# Patient Record
Sex: FEMALE | Race: WHITE | NOT HISPANIC OR LATINO | Employment: FULL TIME | URBAN - METROPOLITAN AREA
[De-identification: names, ages, dates, MRNs, and addresses within clinical notes are randomized per-mention and may not be internally consistent; named-entity substitution may affect disease eponyms.]

---

## 2021-12-17 ENCOUNTER — OFFICE VISIT (OUTPATIENT)
Dept: URGENT CARE | Facility: URGENT CARE | Age: 31
End: 2021-12-17
Payer: COMMERCIAL

## 2021-12-17 VITALS
DIASTOLIC BLOOD PRESSURE: 81 MMHG | HEART RATE: 90 BPM | SYSTOLIC BLOOD PRESSURE: 121 MMHG | TEMPERATURE: 98.3 F | OXYGEN SATURATION: 98 %

## 2021-12-17 DIAGNOSIS — H66.93 ACUTE BILATERAL OTITIS MEDIA: Primary | ICD-10-CM

## 2021-12-17 DIAGNOSIS — R05.9 COUGH: ICD-10-CM

## 2021-12-17 DIAGNOSIS — H69.92 DYSFUNCTION OF LEFT EUSTACHIAN TUBE: ICD-10-CM

## 2021-12-17 DIAGNOSIS — H92.03 ACUTE EAR PAIN, BILATERAL: ICD-10-CM

## 2021-12-17 PROCEDURE — 99203 OFFICE O/P NEW LOW 30 MIN: CPT | Performed by: FAMILY MEDICINE

## 2021-12-17 PROCEDURE — U0003 INFECTIOUS AGENT DETECTION BY NUCLEIC ACID (DNA OR RNA); SEVERE ACUTE RESPIRATORY SYNDROME CORONAVIRUS 2 (SARS-COV-2) (CORONAVIRUS DISEASE [COVID-19]), AMPLIFIED PROBE TECHNIQUE, MAKING USE OF HIGH THROUGHPUT TECHNOLOGIES AS DESCRIBED BY CMS-2020-01-R: HCPCS | Performed by: FAMILY MEDICINE

## 2021-12-17 PROCEDURE — U0005 INFEC AGEN DETEC AMPLI PROBE: HCPCS | Performed by: FAMILY MEDICINE

## 2021-12-17 RX ORDER — FLUTICASONE PROPIONATE 50 MCG
2 SPRAY, SUSPENSION (ML) NASAL DAILY
Qty: 18.2 ML | Refills: 1 | Status: SHIPPED | OUTPATIENT
Start: 2021-12-17

## 2021-12-17 RX ORDER — AMOXICILLIN 875 MG
875 TABLET ORAL 2 TIMES DAILY
Qty: 20 TABLET | Refills: 0 | Status: SHIPPED | OUTPATIENT
Start: 2021-12-17 | End: 2021-12-27

## 2021-12-17 NOTE — LETTER
[facility] [place]  [address]  [address]  [place] [address]  [phone]      December 17, 2021    RE:  Danni Acuna                                                                                                                                                       [address] [address]            To whom it may concern:    Danni Acuna is under my professional care at the [facility] on December 17, 2021.   She has pain in both ears, otitis media of the ears, and eustachian tube dysfunction.  As a result please excuse her absence from work on December 17, 2021. .   She may resume flying on December 18, 2021, provided that her ears are feeling better.            Sincerely,        Anthony Arndt MD    [facility]

## 2021-12-17 NOTE — PROGRESS NOTES
SUBJECTIVE:   Danni Acuna is a 31 year old female--she works as a --presenting with a chief complaint of sudden-onset bilateral ear pain during the descent on a flight from [place] this morning, nasal congestion since yesterday, body aches in the back (upper back and posterior shoulders) since three days ago.  .  ..    Current and Associated symptoms: There has been decreased hearing.  No ear discharge.  No dizziness.  No ringing in th ears.  No balance problems. No bleeding from the ears.     Treatment measures tried include none. .  Predisposing factors include Patient works as a  for [employer].  .  .    Patient received two doses of the Moderna COVID-19 vaccine.  The second one was received on May 22, 2021.  .        No loss of smell/taste  No fevers  No shortness of breath  Not much cough  No headaches  No fatigue  No body aches  No bluish lips/toes/fingers  No chest pain  No vomiting  No diarrhea  No abdominal pain      Past Medical History:    Fracture of the left ankle.      No current outpatient medications on file.     Social History     Tobacco Use     Smoking status: Not on file     Smokeless tobacco: Not on file   Substance Use Topics     Alcohol use: Not on file     Patient is a  for [employer].  She lives in the [place] area.      ROS:  CONSTITUTIONAL:NEGATIVE  for fevers.   ENT/MOUTH:  Positive for bilateral ear pain.      OBJECTIVE:  /81   Pulse 90   Temp 98.3  F (36.8  C) (Tympanic)   SpO2 98%   Breastfeeding No   GENERAL APPEARANCE: healthy, alert and no distress.  No acute respiratory distress.    HENT: TM erythematous bilateral, TM congested/bulging bilateral (left more than right) and nasal turbinates erythematous, swollen (right more than left).  Oropharynx has mild erythema without exudates.  No tympanic membrane perforation was seen.  No discharge/bleeding in the ear canals.    NECK: supple, nontender, no  lymphadenopathy  RESP: lungs clear to auscultation - no rales, rhonchi or wheezes  CV: regular rates and rhythm, normal S1 S2, no murmur noted  SKIN: no suspicious lesions or rashes    ASSESSMENT:  Otitis Media bilateral ears  Eustachian Tube Dysfunction  Bilateral Ear Pain  Cough      PLAN:  For the Ear pain:  Take Tylenol and/or Ibuprofen    For the bilateral Otitis Media:  Rx:  Amoxicillin.   follow up if not better in 5-7 days.     For the eustachian Tube dysfunction:  Rx:  Flonase nasal spray  Do frequent swallowing and gentle nose blowing.   follow up if not better in 5-7 days.     For the Cough:  Pending lab:  COVID-19 PCR Test.      I wrote a work note for the patient.     Anthony Arndt MD

## 2021-12-17 NOTE — PATIENT INSTRUCTIONS
"Do frequent yawning to open up the eustachian tubes    Do gentle nose blowing to open the eustachian tubes.     Take Ibuprofen and/or Tylenol (Acetaminophen) for the ear pain.      follow up with your primary care provider if not better in 5-7 days.        Patient Education   After Your COVID-19 (Coronavirus) Test  You have been tested for COVID-19 (coronavirus).   If you'll have surgery in the next few days, we'll let you know ahead of time if you have the virus. Please call your surgeon's office with any questions.  For all other patients: Results are usually available in [employer] within 2 to 3 days.   If you do not have a [employer] account, you'll get a letter in the mail in about 7 to 10 days.   [employer] is often the fastest way to get test results. Please sign up if you do not already have a [employer] account. See the handout Getting COVID-19 Test Results in [employer] for help.  What if my test result is positive?  If your test is positive and you have not viewed your result in [employer], you'll get a phone call with your result. (A positive test means that you have the virus.)     Follow the tips under \"How do I self-isolate?\" below for 10 days (20 days if you have a weak immune system).    You don't need to be retested for COVID-19 before going back to school or work. As long as you're fever-free and feeling better, you can go back to school, work and other activities after waiting the 10 or 20 days.  What if I have questions after I get my results?  If you have questions about your results, please visit our testing website at [email or web address].   After 2 days, if you have not gotten your results:     Call [phone] ([phone]) and ask to speak with our COVID-19 results team.    If you're being treated at an infusion center: Call your infusion center directly.  What are the symptoms of COVID-19?  Cough, fever and trouble breathing are the most common signs of COVID-19.  Other " "symptoms can include new headaches, new muscle or body aches, new and unexplained fatigue (feeling very tired), chills, sore throat, congestion (stuffy or runny nose), diarrhea (loose poop), loss of taste or smell, belly pain, and nausea or vomiting (feeling sick to your stomach or throwing up).  You may already have symptoms of COVID-19, or they may show up later.  What should I do if I have symptoms?  If you're having surgery: Call your surgeon's office.  For all other patients: Stay home and away from others (self-isolate) until ...    You've had no fever--and no medicine that reduces fever--for 1 full day (24 hours), AND    Other symptoms have gotten better. For example, your cough or breathing has improved, AND    At least 10 days have passed since your symptoms first started.  How do I self-isolate?    Stay in your own room, even for meals. Use your own bathroom if you can.    Stay away from others in your home. No hugging, kissing or shaking hands. No visitors.    Don't go to work, school or anywhere else.    Clean \"high touch\" surfaces often (doorknobs, counters, handles). Use household cleaning spray or wipes. You'll find a full list of  on the EPA website: www.epa.gov/pesticide-registration/list-n-disinfectants-use-against-sars-cov-2.    Cover your mouth and nose with a mask or other face covering to avoid spreading germs.    Wash your hands and face often. Use soap and water.    Caregivers in these groups are at risk for severe illness due to COVID-19:  ? People 65 years and older  ? People who live in a nursing home or long-term care facility  ? People with chronic disease (lung, heart, cancer, diabetes, kidney, liver, immunologic)  ? People who have a weakened immune system, including those who:    Are in cancer treatment    Take medicine that weakens the immune system, such as corticosteroids    Had a bone marrow or organ transplant    Have an immune deficiency    Have poorly controlled HIV or " AIDS    Are obese (body mass index of 40 or higher)    Smoke regularly    Caregivers should wear gloves while washing dishes, handling laundry and cleaning bedrooms and bathrooms.    Use caution when washing and drying laundry: Don't shake dirty laundry and use the warmest water setting that you can.    For more tips on managing your health at home, go to www.cdc.gov/coronavirus/2019-ncov/downloads/10Things.pdf.  How can I take care of myself at home?  1. Get lots of rest. Drink extra fluids (unless a doctor has told you not to).  2. Take Tylenol (acetaminophen) for fever or pain. If you have liver or kidney problems, ask your family doctor if it's OK to take Tylenol.   Adults can take either:  ? 650 mg (two 325 mg pills) every 4 to 6 hours, or   ? 1,000 mg (two 500 mg pills) every 8 hours as needed.  ? Note: Don't take more than 3,000 mg in one day. Acetaminophen is found in many medicines (both prescribed and over-the-counter medicines). Read all labels to be sure you don't take too much.   For children, check the Tylenol bottle for the right dose. The dose is based on the child's age or weight.  3. If you have other health problems (like cancer, heart failure, an organ transplant or severe kidney disease): Call your specialty clinic if you don't feel better in the next 2 days.  4. Know when to call 911. Emergency warning signs include:  ? Trouble breathing or shortness of breath  ? Chest pain or pressure that doesn't go away  ? Feeling confused like you haven't felt before, or not being able to wake up  ? Bluish-colored lips or face  5. If your doctor prescribed a blood thinner medicine: Follow their instructions.  Where can I get more information?     [facility] - About COVID-19:   [email or web address]    CDC - If You're Sick: cdc.gov/coronavirus/2019-ncov/about/steps-when-sick.html    CDC - Ending Home Isolation: www.cdc.gov/coronavirus/2019-ncov/hcp/disposition-in-home-patients.html    CDC -  Caring for Someone: www.cdc.gov/coronavirus/2019-ncov/if-you-are-sick/care-for-someone.html    [facility] - Interim Guidance for Hospital Discharge to Home: www.health.Counts include 234 beds at the [facility].[place]/diseases/coronavirus/hcp/hospdischarge.pdf    [facility] clinical trials (COVID-19 research studies): clinicalaffairs.[facility]-clinical-trials    Below are the COVID-19 hotlines at the [place] Department of Health ([facility]). Interpreters are available.  ? For health questions: Call 515-801-7594 or 1-873.323.2020 (7 a.m. to 7 p.m.)  ? For questions about schools and childcare: Call 386-794-3186 or 1-771.921.5039 (7 a.m. to 7 p.m.)    For informational purposes only. Not to replace the advice of your health care provider. Clinically reviewed by Infection Prevention and the [facility] COVID-19 Clinical Team. Copyright   2020 [facility]. All rights reserved. [id number] - Rev 11/11/20.

## 2021-12-18 LAB — SARS-COV-2 RNA RESP QL NAA+PROBE: NEGATIVE

## 2022-02-06 ENCOUNTER — HEALTH MAINTENANCE LETTER (OUTPATIENT)
Age: 32
End: 2022-02-06

## 2022-10-03 ENCOUNTER — HEALTH MAINTENANCE LETTER (OUTPATIENT)
Age: 32
End: 2022-10-03

## 2023-02-12 ENCOUNTER — HEALTH MAINTENANCE LETTER (OUTPATIENT)
Age: 33
End: 2023-02-12

## 2024-03-09 ENCOUNTER — HEALTH MAINTENANCE LETTER (OUTPATIENT)
Age: 34
End: 2024-03-09